# Patient Record
Sex: MALE | Race: WHITE | NOT HISPANIC OR LATINO | URBAN - METROPOLITAN AREA
[De-identification: names, ages, dates, MRNs, and addresses within clinical notes are randomized per-mention and may not be internally consistent; named-entity substitution may affect disease eponyms.]

---

## 2024-01-08 PROBLEM — Z00.00 ENCOUNTER FOR PREVENTIVE HEALTH EXAMINATION: Status: ACTIVE | Noted: 2024-01-08

## 2024-06-04 ENCOUNTER — EMERGENCY (EMERGENCY)
Facility: HOSPITAL | Age: 44
LOS: 1 days | Discharge: HOME | End: 2024-06-04
Payer: MEDICAID

## 2024-06-04 VITALS
RESPIRATION RATE: 18 BRPM | OXYGEN SATURATION: 99 % | DIASTOLIC BLOOD PRESSURE: 78 MMHG | TEMPERATURE: 98 F | HEIGHT: 65 IN | SYSTOLIC BLOOD PRESSURE: 126 MMHG | HEART RATE: 72 BPM | WEIGHT: 159.17 LBS

## 2024-06-04 PROCEDURE — 99283 EMERGENCY DEPT VISIT LOW MDM: CPT | Mod: 25

## 2024-06-04 PROCEDURE — 99284 EMERGENCY DEPT VISIT MOD MDM: CPT

## 2024-06-04 PROCEDURE — 96372 THER/PROPH/DIAG INJ SC/IM: CPT

## 2024-06-04 RX ORDER — IBUPROFEN 200 MG
1 TABLET ORAL
Qty: 28 | Refills: 0
Start: 2024-06-04 | End: 2024-06-10

## 2024-06-04 RX ORDER — KETOROLAC TROMETHAMINE 30 MG/ML
30 SYRINGE (ML) INJECTION ONCE
Refills: 0 | Status: DISCONTINUED | OUTPATIENT
Start: 2024-06-04 | End: 2024-06-04

## 2024-06-04 RX ORDER — LIDOCAINE 4 G/100G
1 CREAM TOPICAL
Qty: 1 | Refills: 0
Start: 2024-06-04 | End: 2024-06-08

## 2024-06-04 RX ORDER — LIDOCAINE 4 G/100G
1 CREAM TOPICAL ONCE
Refills: 0 | Status: COMPLETED | OUTPATIENT
Start: 2024-06-04 | End: 2024-06-04

## 2024-06-04 RX ORDER — METHOCARBAMOL 500 MG/1
1500 TABLET, FILM COATED ORAL ONCE
Refills: 0 | Status: COMPLETED | OUTPATIENT
Start: 2024-06-04 | End: 2024-06-04

## 2024-06-04 RX ORDER — METHOCARBAMOL 500 MG/1
2 TABLET, FILM COATED ORAL
Qty: 18 | Refills: 0
Start: 2024-06-04 | End: 2024-06-06

## 2024-06-04 RX ADMIN — METHOCARBAMOL 1500 MILLIGRAM(S): 500 TABLET, FILM COATED ORAL at 22:15

## 2024-06-04 RX ADMIN — LIDOCAINE 1 PATCH: 4 CREAM TOPICAL at 22:15

## 2024-06-04 RX ADMIN — Medication 30 MILLIGRAM(S): at 22:15

## 2024-06-04 RX ADMIN — Medication 30 MILLIGRAM(S): at 22:30

## 2024-06-04 NOTE — ED PROVIDER NOTE - PATIENT PORTAL LINK FT
You can access the FollowMyHealth Patient Portal offered by [facility] by registering at the following website: [email or web address]. By joining [employer]’s FollowMyHealth portal, you will also be able to view your health information using other applications (apps) compatible with our system.

## 2024-06-04 NOTE — ED PROVIDER NOTE - OBJECTIVE STATEMENT
43-year-old male with no past medical history presents with bilateral lower back pain that radiates down his butt and posterior thighs stopping at his knees.  Patient reports pain with sitting and standing.  Patient states the pain began 1 month ago after doing hill repeats.  Patient states he is taken Tylenol intermittently with little relief.  Denies any dark-colored urine. Denies LE numbness, tingling, weakness, saddle anesthesia, fever, chills, IVDU, hx of cancer, bowel or bladder incontinence or any other concerning features.

## 2024-06-04 NOTE — ED PROVIDER NOTE - PHYSICAL EXAMINATION
Back is symmetrical, scapulae are symmetric. Neck has full range of motion. No spinal tenderness, deformity or step-offs. All limbs equally strong 5+ bilaterally. Strong ankle dorsiflexion and plantar flexion against resistance bilaterally. Strong flexion/extension of big toe bilaterally. Pt is able to walk in straight line with steady gait. No recent weight loss or night sweats. No saddle anesthesia, no bowel/bladder incontinence or retention, no lower extremity weakness. +b/l lower paraspinal tenderness. _b/l straight leg raise.

## 2024-06-04 NOTE — ED PROVIDER NOTE - NSFOLLOWUPINSTRUCTIONS_ED_ALL_ED_FT
Follow up with your primary care doctor within 1 week.     Pain medications sent to the pharmacy. Take as prescribed and as needed.    If you experience any new or worsening symptoms or if you are concerned you can always come back to the emergency for a re-evaluation.    Back Pain    Back pain can be scary. But even when the pain is severe, it usually goes away on its own within a few weeks. The cases that require urgent care or surgery are rare. Do not assume the worst. Almost everyone gets back pain at some point.     See your doctor or nurse if you have back pain and you:    -Recently had a fall or an injury to your back    -Have numbness or weakness in your legs    -Have problems with bladder or bowel control    -Have unexplained weight loss    -Have a fever or feel sick in other ways    -Take steroid medicine, such as prednisone, on a regular basis    -Have diabetes or a medical problem that weakens your immune system    -Have a history of cancer or osteoporosis    You should also see a doctor if:    -Your back pain is so severe that you cannot perform simple tasks    -Your back pain does not start to improve within 4 weeks    Many different things can cause low back pain. Most of the time, doctors do not know the exact cause.    Back pain can happen if you strain a muscle. This is often what has happened when a person "throws out" their back. This refers to pain that starts suddenly after physical activity, like lifting something heavy or bending the back.    Back pain can also happen if you have:    -Damaged, bulging, or torn discs    -Arthritis affecting the joints of the spine    -Bony growths on the vertebrae that crowd nearby nerves    -A vertebra out of place    -Narrowing in the spinal canal    -A tumor or infection (but this is very rare)    Most people with an episode of low back pain do not have a serious medical problem, and can try simple treatments such as:    -Staying active – The best thing you can do is to stay as active as possible. People with low back pain recover faster if they stay active. If your pain is severe, you might need to rest for a day or 2. But it's important to get back to walking and moving as soon as possible. While you should avoid heavy lifting and sports while your back hurts, try to keep doing your normal daily activities.    -Heat – Some people find that it helps to use a heating pad or heated wrap. Be careful to avoid high heat settings to prevent skin burns.    -Medicines – First, you can try pain medicines that you can get without a prescription. In many cases, doctors suggest first trying a nonsteroidal antiinflammatory drug, or "NSAID." NSAIDs include ibuprofen (sample brand names: Advil, Motrin) and naproxen (sample brand name: Aleve). These might work better than acetaminophen (Tylenol) for back pain.    -Treatments to help with symptoms – Some treatments might help you feel better for a little while. They include:    -Spinal manipulation – This is when a chiropractor, physical therapist, or other professional moves or "adjusts" the joints of your back. If you want to try this, talk to your doctor or nurse first.    -Acupuncture – This is when someone who knows traditional Chinese medicine inserts tiny needles into your body to block pain signals.    -Massage    While back pain usually goes away within a few weeks, some people do continue to have pain for longer. In this case, additional treatments might include:    -Self care – This involves being aware of your pain. While you should rest when you need to, it's important to stay active as much as you can. Things like applying heat and doing gentle stretches can help you feel better, too.    -Physical therapy – A physical therapist is an exercise expert who can teach you stretches and movements to help strengthen your muscles. The goal is to relieve pain but also help you get back to your normal activities. Exercises you can try include walking, swimming, or using an exercise bike. Some people also find that [place] [employer] or yoga can help with their back pain. Finding activities you enjoy can help you stay active.    -Reducing stress – Some people find that it helps to try something called "mindfulness-based stress reduction." This involves going to a group program to practice relaxation and meditation. If your back pain is making you feel anxious or depressed, talk to your doctor or nurse. There are other treatments that can help with these problems.

## 2024-06-04 NOTE — ED PROVIDER NOTE - CLINICAL SUMMARY MEDICAL DECISION MAKING FREE TEXT BOX
43-year-old male with no past medical history presents with bilateral lower back pain that radiates down his butt and posterior thighs stopping at his knees.  Patient reports pain with sitting and standing.  Patient states the pain began 1 month ago after doing hill repeats.  Patient states he is taken Tylenol intermittently with little relief.  Denies any dark-colored urine. Denies LE numbness, tingling, weakness, saddle anesthesia, fever, chills, IVDU, hx of cancer, bowel or bladder incontinence or any other concerning features.     Patient with a positive b/l straight leg raise and b/l lower paraspinal tenderness. Patient is neurologically intact with no concerning features.  Exam consistent with radiculopathy.  No concerns at this time for cauda equina, infection, possible cancer or fractures.  Will give patient medication for pain.  Offered to observe patient to evaluate for improvement in symptoms, patient opted to go home and follow-up outpatient with PCP.

## 2024-11-07 ENCOUNTER — EMERGENCY (EMERGENCY)
Facility: HOSPITAL | Age: 44
LOS: 1 days | Discharge: HOME | End: 2024-11-07
Attending: EMERGENCY MEDICINE
Payer: MEDICAID

## 2024-11-07 VITALS
HEIGHT: 65 IN | SYSTOLIC BLOOD PRESSURE: 121 MMHG | OXYGEN SATURATION: 100 % | RESPIRATION RATE: 18 BRPM | DIASTOLIC BLOOD PRESSURE: 79 MMHG | HEART RATE: 96 BPM | TEMPERATURE: 99 F | WEIGHT: 164.91 LBS

## 2024-11-07 LAB
FLUAV AG NPH QL: SIGNIFICANT CHANGE UP
FLUBV AG NPH QL: SIGNIFICANT CHANGE UP
RSV RNA NPH QL NAA+NON-PROBE: SIGNIFICANT CHANGE UP
SARS-COV-2 RNA SPEC QL NAA+PROBE: SIGNIFICANT CHANGE UP

## 2024-11-07 PROCEDURE — 87637 SARSCOV2&INF A&B&RSV AMP PRB: CPT

## 2024-11-07 PROCEDURE — 99283 EMERGENCY DEPT VISIT LOW MDM: CPT

## 2024-11-07 NOTE — ED PROVIDER NOTE - CLINICAL SUMMARY MEDICAL DECISION MAKING FREE TEXT BOX
Patient presenting with symptoms of a viral URI.  Patient requesting flu and COVID testing in the emergency department which was performed.  Otherwise we will recommend symptomatic management.

## 2024-11-07 NOTE — ED ADULT NURSE NOTE - NSFALLUNIVINTERV_ED_ALL_ED
Bed/Stretcher in lowest position, wheels locked, appropriate side rails in place/Call bell, personal items and telephone in reach/Instruct patient to call for assistance before getting out of bed/chair/stretcher/Non-slip footwear applied when patient is off stretcher/[place] to call system/Physically safe environment - no spills, clutter or unnecessary equipment/Purposeful proactive rounding/Room/bathroom lighting operational, light cord in reach

## 2024-11-07 NOTE — ED PROVIDER NOTE - OBJECTIVE STATEMENT
44-year-old man denying any significant past medical or surgical history presenting reporting 2 to 3 days of cough, body aches and malaise.  Has not take any medications at home for symptoms.  He does report that a family member has a similar cough.  Denying any shortness of breath.

## 2024-11-07 NOTE — ED ADULT NURSE NOTE - OBJECTIVE STATEMENT
patient endorsing chills, intermittent fevers, cough, and myalgia for the last 4 days. c/o sciatica pain - b/l knees and hip. 6/10. denies chest pain and dizziness.

## 2024-11-07 NOTE — ED PROVIDER NOTE - PATIENT PORTAL LINK FT
You can access the FollowMyHealth Patient Portal offered by [facility] by registering at the following website: [email or web address]. By joining [employer]’s FollowMyHealth portal, you will also be able to view your health information using other applications (apps) compatible with our system.

## 2024-11-07 NOTE — ED PROVIDER NOTE - NSFOLLOWUPINSTRUCTIONS_ED_ALL_ED_FT
Thank you for choosing [facility] for your healthcare.    You were seen in the Emergency Department for viral symptoms.  In the emergency department you had a flu COVID and RSV test which was negative.  Please take over-the-counter medications like Tylenol and ibuprofen as needed for cough and cold-like symptoms rest and drink plenty of fluids.  Please follow-up with your primary care doctor as needed.

## 2024-12-13 ENCOUNTER — APPOINTMENT (OUTPATIENT)
Dept: ORTHOPEDIC SURGERY | Facility: CLINIC | Age: 44
End: 2024-12-13
Payer: MEDICAID

## 2024-12-13 DIAGNOSIS — M51.26 OTHER INTERVERTEBRAL DISC DISPLACEMENT, LUMBAR REGION: ICD-10-CM

## 2024-12-13 DIAGNOSIS — M54.16 RADICULOPATHY, LUMBAR REGION: ICD-10-CM

## 2024-12-13 DIAGNOSIS — M48.07 SPINAL STENOSIS, LUMBOSACRAL REGION: ICD-10-CM

## 2024-12-13 PROCEDURE — 99203 OFFICE O/P NEW LOW 30 MIN: CPT

## 2025-01-03 ENCOUNTER — APPOINTMENT (OUTPATIENT)
Dept: ORTHOPEDIC SURGERY | Facility: CLINIC | Age: 45
End: 2025-01-03